# Patient Record
Sex: FEMALE | Race: ASIAN | NOT HISPANIC OR LATINO | Employment: UNEMPLOYED | URBAN - METROPOLITAN AREA
[De-identification: names, ages, dates, MRNs, and addresses within clinical notes are randomized per-mention and may not be internally consistent; named-entity substitution may affect disease eponyms.]

---

## 2017-11-30 ENCOUNTER — OFFICE VISIT (OUTPATIENT)
Dept: NEUROLOGY | Facility: MEDICAL CENTER | Age: 82
End: 2017-11-30
Payer: MEDICARE

## 2017-11-30 VITALS
DIASTOLIC BLOOD PRESSURE: 50 MMHG | OXYGEN SATURATION: 98 % | SYSTOLIC BLOOD PRESSURE: 142 MMHG | HEIGHT: 55 IN | TEMPERATURE: 98.2 F | HEART RATE: 78 BPM

## 2017-11-30 DIAGNOSIS — G30.1 LATE ONSET ALZHEIMER'S DISEASE WITHOUT BEHAVIORAL DISTURBANCE (HCC): ICD-10-CM

## 2017-11-30 DIAGNOSIS — F02.80 LATE ONSET ALZHEIMER'S DISEASE WITHOUT BEHAVIORAL DISTURBANCE (HCC): ICD-10-CM

## 2017-11-30 DIAGNOSIS — I63.031 CEREBROVASCULAR ACCIDENT (CVA) DUE TO THROMBOSIS OF RIGHT CAROTID ARTERY (HCC): ICD-10-CM

## 2017-11-30 PROCEDURE — 99203 OFFICE O/P NEW LOW 30 MIN: CPT

## 2017-11-30 RX ORDER — ALENDRONATE SODIUM 70 MG/1
70 TABLET ORAL
COMMUNITY

## 2017-11-30 RX ORDER — CHOLECALCIFEROL (VITAMIN D3) 125 MCG
CAPSULE ORAL
COMMUNITY

## 2017-11-30 RX ORDER — CHOLECALCIFEROL (VITAMIN D3) 125 MCG
500 CAPSULE ORAL DAILY
COMMUNITY

## 2017-11-30 NOTE — PROGRESS NOTES
Neurology Consult Note  11/30/2017      Referring MD:  Dr. Nasim Krishna    Patient ID:  Name:             Bin Echols     YOB: 1929  Age:                 88 y.o.  female   MRN:               [id number]                                              Reason for Consult:      Evaluation of cognitive deficits and somewhat distant CVA    History of Present Illness:    This 88-year-old woman is Wolof and has limited English capacity and of late been speaking in Wolof more and more rather than her English which she has usually used communicate with the family. They have noticed that her memory is diminishing and she is having more trouble with activities of daily living and so they have assistance in the home both hired help and family. She had an event that left her with a left hemiparesis and was seen in the emergency room but discharged and then underwent a rehabilitation procedure to develop increased use of her left side and ability to walk unlimited distance. She has been on Namenda and is currently on the maximum dose and is on a bisphosphonate and aspirin and amlodipine. She can walk with assistance but prefers wheelchair for longer distances. Her weight has been stable.    Review of Systems: Relates to her recent CVA.  Constitutional: Denies fevers, Denies weight changes  Eyes: Denies changes in vision, no eye pain  Ears/Nose/Throat/Mouth: Denies nasal congestion or sore throat   Cardiovascular: Denies chest pain, Denies palpitations   Respiratory: Denies shortness of breath , Denies cough  Gastrointestinal/Hepatic: Denies abdominal pain, nausea, vomiting, diarrhea, constipation or GI bleeding   Genitourinary: Denies dysuria or frequency  Musculoskeletal/Rheum: Denies  joint pain and swelling, No edema  Skin: Denies rash  Neurological: Significant memory issues.  Psychiatric: denies mood disorder   Endocrine: Amrita thyroid problems  Heme/Oncology/Lymph Nodes: Denies enlarged lymph nodes,  denies brusing or known bleeding disorder  All other systems were reviewed and are negative (AMA/CMS criteria)                Past Medical History:     Past Medical History:   Diagnosis Date   • Anemia    • Hypertension    • Short-term memory loss        Problems addressed this visit:    Problem List Items Addressed This Visit     None      Visit Diagnoses     Late onset Alzheimer's disease without behavioral disturbance        Cerebrovascular accident (CVA) due to thrombosis of right carotid artery (CMS-HCC)              Past Surgical History:   No past surgical history on file.      Current Outpatient Medications:  Current Outpatient Prescriptions   Medication   • alendronate (FOSAMAX) 70 MG Tab   • aspirin EC (ECOTRIN) 81 MG Tablet Delayed Response   • Cholecalciferol (VITAMIN D3) 2000 units Tab   • cyanocobalamin (VITAMIN B-12) 500 MCG Tab   • Memantine HCl ER 28 MG CP24   • amlodipine (NORVASC) 5 MG TABS   • Memantine HCl ER (NAMENDA XR TITRATION PACK) 7 & 14 & 21 &28 MG CP24     No current facility-administered medications for this visit.        Medication Allergy:  No Known Allergies    Family History:  No family history on file.    Social History:  Social History     Social History   • Marital status:      Spouse name: N/A   • Number of children: N/A   • Years of education: N/A     Occupational History   • Not on file.     Social History Main Topics   • Smoking status: Never Smoker   • Smokeless tobacco: Never Used   • Alcohol use No   • Drug use: No   • Sexual activity: Yes     Partners: Male      Comment:      Other Topics Concern   • Not on file     Social History Narrative   • No narrative on file       Physical Exam:She is a very small [place] woman seated in a wheelchair. She has a mild left hemiparesis with increased tone in the left arm and left leg but does not have significant facial weakness. Visual fields are grossly full and extraocular movements are intact. She can get out of a  "chair and walk with assistance but is unstable. There is decreased pinprick on the left body and face. Coordination in the right upper and right lower extremity appears to be intact. Mental status testing consisted of the draw a clock test and she could in fact draw the [place] and put in the numbers with continued prompting. He was zero out of 3 on memory. She could not name animals in English and could not write except her name.  Vitals:   Vitals:    11/30/17 1454   BP: 142/50   Pulse: 78   Temp: 36.8 °C (98.2 °F)   SpO2: 98%   Height: 1.397 m (4' 7\")     General Appearance:   Weight/BMI: There is no height or weight on file to calculate BMI.    Neurologic Exam:   AOx3: Abnormal not oriented  Recent & remote memory intact: No zero recent memory  Attentive with normal coordination: No left hemiparesis  Normal spontaneous speech pattern: No reverts to her native [place]  Age appropriate fund of knowledge: No can't be tested because of language issues  Cranial nerve II intact: Normal  Cranial nerve III, IV & VI intact: Normal  Cranial nerve V intact: Normal  Cranial nerve VIII intact: Normal  Cranial nerve IX intact: Normal  Cranial nerve XI intact: Normal  Cranial nerve XII intact: Normal  No sensory deficits: Normal  DTRs intact & symmetrical: Abnormal increased reflexes left side  No dysdiadochokinesia or dysmetria: Abnormal left hemiparesis    Eyes:  Normal optic discs: Yes  Visual field: Normal  Pupillary responses: Normal  Extraocular movement: Normal    Cardiovascular:  Normal carotid pulses bilaterally: Yes  RRR with no MRGs: Yes  No peripheral edema, pulses intact: Yes    Musculoskeletal:  Normal gait and station: No needs assistance or wheelchair  Normal muscle strength: No left-sided weakness  Normal muscle tone, no atrophy: Yes  No abnormal movements: Yes    Plan:   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 I suspect this is " probably Alzheimer's complicated by vascular event on the right hemisphere. The issue for evaluation was whether she is competent to manage her affairs and the answers that she is not. She will need a guardianship and she is not capable of attending the hearing. The appropriate documents were completed. He will be seen again as needed.    Total length of time of this visit was 40 minutes of which greater than 50% was spent counseling the patient/family and coordinating care.    Thank you for allowing me to participate in his care.    Sincerely yours,        Anthony Sutton MD, PhD